# Patient Record
(demographics unavailable — no encounter records)

---

## 2024-10-07 NOTE — DISCUSSION/SUMMARY
[FreeTextEntry1] : Ms. Rodriguez is a 49 yo , single, female, domiciled with roommate in subsidized residence, on disability, with extensive history of substance use (heroin, crack), was on methadone 90mg Qdaily until August 2024 at which time she self-detoxed off of it, history of depression,  anxiety, and extensive trauma history, no prior IPP admissions or suicide attempts, who presents [facility] for psychiatric medication management. Pt broke her R foot due to mechanical fall in Fall 2023. Discussed eventual need to reduce polypharmacy given that patient is on multiple sedating medications--while she initially agreed, she has requested Xanax and higher doses of benzodiazepines repeatedly for her anxiety triggered by relationship stressors. Patient decompensated in August 2024 after self-detoxing off of 90 mg of methadone and revealed active alcohol use for past several months.  Patient does not have a history of intentional injury to self or others, did not endorse ideations/intentions for self/other harm, and does not show signs of acute psychosis. She does not meet criteria for involuntary inpatient psychiatric admission at this time as she does not represent an acute threat to herself or others.  Risk Assessment: Patient at low acute risk for intentional self-harm or harm to others.  Risk factors include hx of substance use, recent self-detox, psychosocial stressors, trauma hx, and treatment non-adherence Protective factors include no suicidality, residential stability, future orientation, being in active treatment

## 2024-10-07 NOTE — REASON FOR VISIT
[Patient seeking care elsewhere] : Patient seeking care elsewhere [FreeTextEntry9] : 11/4/2019  [FreeTextEntry8] : 10/7/2024 [FreeTextEntry1] : Patient confirms with writer today 10/7 that she has an intake appointment with new psychiatry practice on 10/8/24. She prefers not to divulge name of practice.

## 2024-10-07 NOTE — HISTORY OF PRESENT ILLNESS
[FreeTextEntry2] : extensive history of substance use (heroin, crack), was on methadone 90mg Qdaily until August 2024 at which time she self-detoxed off of it, history of depression,  anxiety, and extensive trauma history, no prior IPP admissions; she was on a 72 hour hold at [facility] from August 12-16th 2024 for suicidal gesture while intoxicated [FreeTextEntry3] : - d/c hydroxyzine 50 mg qday PRN for anxiety due to body aches - Zoloft up to 250 mg in 2021 -- pt self-tapered to 150 on accident, then increased back to 200; in August 2024 [facility] discharged her with instructions to go down to 100 mg but pt misunderstood and self-discontinued Zoloft entirely  [No] : no